# Patient Record
Sex: FEMALE | Race: ASIAN
[De-identification: names, ages, dates, MRNs, and addresses within clinical notes are randomized per-mention and may not be internally consistent; named-entity substitution may affect disease eponyms.]

---

## 2017-10-10 ENCOUNTER — HOSPITAL ENCOUNTER (EMERGENCY)
Dept: HOSPITAL 74 - FER | Age: 32
Discharge: HOME | End: 2017-10-10
Payer: COMMERCIAL

## 2017-10-10 VITALS — SYSTOLIC BLOOD PRESSURE: 124 MMHG | TEMPERATURE: 99.1 F | HEART RATE: 98 BPM | DIASTOLIC BLOOD PRESSURE: 74 MMHG

## 2017-10-10 VITALS — BODY MASS INDEX: 23.1 KG/M2

## 2017-10-10 DIAGNOSIS — K92.9: ICD-10-CM

## 2017-10-10 DIAGNOSIS — B08.5: Primary | ICD-10-CM

## 2017-10-10 DIAGNOSIS — R50.9: ICD-10-CM

## 2017-10-10 LAB
BASOPHILS # BLD: 0.5 % (ref 0–2)
DEPRECATED RDW RBC AUTO: 12.1 % (ref 11.6–15.6)
EOSINOPHIL # BLD: 0.7 % (ref 0–4.5)
MCH RBC QN AUTO: 29.3 PG (ref 25.7–33.7)
MCHC RBC AUTO-ENTMCNC: 34 G/DL (ref 32–36)
MCV RBC: 86.2 FL (ref 80–96)
NEUTROPHILS # BLD: 71.3 % (ref 42.8–82.8)
PLATELET # BLD AUTO: 285 K/MM3 (ref 134–434)
PMV BLD: 7.8 FL (ref 7.5–11.1)
WBC # BLD AUTO: 11.3 K/MM3 (ref 4–10.8)

## 2017-10-10 NOTE — PDOC
History of Present Illness





- General


History Source: Patient


Exam Limitations: No Limitations





- History of Present Illness


Initial Comments: 


10/10/17 20:11





A portion of this note was documented by scribe services under my direction. I 

have reviewed the details of the note, within reason, and agree with the 

documentation.  The case summary and management plan written by me. 





Assessment and plan: This is a 32-year-old female who comes in complaining of 3 

days of the fever chills body aches sore throat and the lesions in her 

posterior oropharynx and mouth.





 Patient had rapid strep done that was negative





Patient had a Monospot sent that wont be done until tomorrow.  





Patient's CBC otherwise showed a white count of 11.3 with no left shift.





Given the lesions in patient's oropharynx and mouth most likely this is 

secondary to a coxsackie-type virus





Patient was told to continue Tylenol and Motrin alternate them as often as 

every 3 hours as needed for pain and fevers and called the ER tomorrow 

afternoon for the result of her mono test.





<Qiana Harden - Last Filed: 10/10/17 20:54>





- General


History Source: Patient


Exam Limitations: No Limitations





- History of Present Illness


Initial Comments: 





10/10/17 20:54


Patient is a 32 year old female with a significant past medical history of 

Gastroparises who presents to the ED with complaints of sore throat that began 

3 days ago.  Patient reports sore throat began saturday afternoon suddenly 

while at home.  She reports experiencing fever secondary to sore throat.  

Patient states experiencing joint pains and what she states pins and needles 

in her bones secondary to sore throat.  She reports experiencing fever and 

mouth sores secondary to sore throat.  Patient states she has a Hx of strep 

throat. 





Denies chest pain, SOB. Denies chills, sweating. Denies contact with sick 

individuals, out of state travel. Denies any other symptoms. 


Allergies: None


Surgical history: None


Social history: No smoking. No alcohol. No illicit drugs. 


PMD: None








<Renaldo Gomez - Last Filed: 10/10/17 20:55>





- General


Chief Complaint: Pain


Stated Complaint: FLU TYPE SYMPTOMS,SORE THROAT


Time Seen by Provider: 10/10/17 19:45





Past History





- Past Medical History


Anemia: No


Asthma: No


Cancer: No


Cardiac Disorders: No


GI Disorders: Yes (GASTROPARISES)





- Surgical History


Abdominal Surgery: No


Appendectomy: No


Cardiac Surgery: No


Cholecystectomy: No





- Suicide/Smoking/Psychosocial Hx


Smoking Status: No


Smoking History: Never smoked


Have you smoked in the past 12 months: No


Number of Cigarettes Smoked Daily: 0


Hx Alcohol Use: No


Drug/Substance Use Hx: No


Hx Substance Use Treatment: No





<Qiana Harden - Last Filed: 10/10/17 20:54>





<Renaldo Gomez - Last Filed: 10/10/17 20:55>





- Past Medical History


Allergies/Adverse Reactions: 


 Allergies











Allergy/AdvReac Type Severity Reaction Status Date / Time


 


No Known Allergies Allergy   Verified 10/10/17 19:39











Home Medications: 


Ambulatory Orders





No Home Medications 0 dose .ROUTE UTDICT 05/01/13 











**Review of Systems





- Review of Systems


Able to Perform ROS?: Yes


Comments:: 





10/10/17 20:55


General:  +Fever


No chills, no weakness, no weight loss 


HEENT: +Sore throat. +Mouth sores. 


No change in vision.  No ear pain


CardioVascular:  No chest pain or shortness of breath


Respiratory:No cough, or wheezing. 


Gastrointestinal:  no nausea, vomiting, diarrhea or constipation,  No rectal 

bleeding


Genitourinary:  No dysuria, hematuria, or frequency


Musculoskeletal:  No joint or muscle pain or swelling


Neurologic: No headache, vertigo, dizziness or loss of consciousness


Psychiatric: nor depression 


Skin: No rashes or easy bruising


Endocrine: no increased thirst or abnormal weight change


Allergic: no skin or latex allergy


All other systems reviewed and normal





All Other Systems: Reviewed and Negative





<Renaldo Gomez - Last Filed: 10/10/17 20:55>





*Physical Exam





- Vital Signs


 Last Vital Signs











Temp Pulse Resp BP Pulse Ox


 


 99.1 F   98 H  16   124/74   100 


 


 10/10/17 19:42  10/10/17 19:42  10/10/17 19:42  10/10/17 19:42  10/10/17 19:42














- Physical Exam


Comments: 





10/10/17 20:55


GENERAL: The patient is awake, alert, and fully 


oriented, in no acute distress.


HEAD: Normal with no signs of trauma.


POSTERIOR OROPHARYNX: MOderate amount of erythema with vesicular lesions. +

Vesicular lesions along base of tongue bilaterally. No exudates. 


NECK: submandibular lymphadenopathy bilaterally. 


EYES: Pupils equal, round and reactive to light, extraocular movements intact, 

sclera anicteric, conjunctiva clear.


EXTREMITIES: Normal range of motion, no edema.


NEUROLOGICAL: Normal speech, normal gait.


PSYCH: Normal mood, normal affect.


SKIN: Warm, Dry, normal turgor, no rashes or lesions noted.








<Renaldo Gomez - Last Filed: 10/10/17 20:55>





ED Treatment Course





- LABORATORY


CBC & Chemistry Diagram: 


 10/10/17 20:05








<Qiana Harden - Last Filed: 10/10/17 20:54>





- LABORATORY


CBC & Chemistry Diagram: 


 10/10/17 20:05








- ADDITIONAL ORDERS


Additional order review: 














 10/10/17 20:05 Group A Strep Rapid Antigen - Final





 Throat    NEGATIVE FOR THE ANTIGEN OF BETA HEMOLYTIC STREP GROUP A








 











  10/10/17





  20:05


 


RBC  4.49


 


MCV  86.2


 


MCHC  34.0


 


RDW  12.1


 


MPV  7.8


 


Neutrophils %  71.3


 


Lymphocytes %  18.7


 


Monocytes %  8.8


 


Eosinophils %  0.7


 


Basophils %  0.5














- Medications


Given in the ED: 


ED Medications














Discontinued Medications














Generic Name Dose Route Start Last Admin





  Trade Name Freq  PRN Reason Stop Dose Admin


 


Ibuprofen  600 mg 10/10/17 20:03 10/10/17 20:07





  Motrin -  PO 10/10/17 20:04  600 mg





  ONCE ONE   Administration














<Renlado Gomez - Last Filed: 10/10/17 20:55>





*DC/Admit/Observation/Transfer





<Qiana Harden - Last Filed: 10/10/17 20:54>





- Attestations


Scribe Attestion: 





10/10/17 20:55





Documentation prepared by Renaldo Gomez, acting as medical scribe for 

Qiana Harden MD/DO.





<Renaldo Gomez - Last Filed: 10/10/17 20:55>


Diagnosis at time of Disposition: 


 Fever and chills





Pharyngitis


Qualifiers:


 Pharyngitis/tonsillitis etiology: Coxsackie virus Qualified Code(s): B08.5 - 

Enteroviral vesicular pharyngitis





- Discharge Dispostion


Disposition: HOME


Condition at time of disposition: Stable





- Patient Instructions


Additional Instructions: 


The test we did for strep throat was negative however it is a rapid test and 

there is some false negative so it will be cultured and call in 2 days for the 

result of the culture.





The Monospot will not be available until tomorrow afternoon U can call tomorrow 

afternoon for the result.





Alternate acetaminophen with ibuprofen as often as every 3 hours for pain or 

fevers.





You were given some prednisone in the emergency room this should help with the 

inflammatory response of your throat.





Return to the emergency department immediately with ANY new, persistent or 

worsening symptoms.





Continue any medications as previously prescribed by your physician.





You should follow up with your primary doctor as soon as possible regarding 

today's emergency department visit.


.


Please make sure your doctor reviews the results of your emergency evaluation.





Thank you for coming to the   Emergency Department today for your care. It was 

a pleasure to see you today. Please note that your evaluation is INCOMPLETE 

until you  follow-up with your doctor.

## 2018-12-30 ENCOUNTER — HOSPITAL ENCOUNTER (EMERGENCY)
Dept: HOSPITAL 74 - FER | Age: 33
Discharge: HOME | End: 2018-12-30
Payer: COMMERCIAL

## 2018-12-30 VITALS — BODY MASS INDEX: 22.4 KG/M2

## 2018-12-30 VITALS — TEMPERATURE: 98.8 F | SYSTOLIC BLOOD PRESSURE: 140 MMHG | HEART RATE: 89 BPM | DIASTOLIC BLOOD PRESSURE: 87 MMHG

## 2018-12-30 DIAGNOSIS — R07.89: Primary | ICD-10-CM

## 2018-12-30 LAB
ANION GAP SERPL CALC-SCNC: 9 MMOL/L (ref 8–16)
BASOPHILS # BLD: 0.4 % (ref 0–2)
BUN SERPL-MCNC: 9 MG/DL (ref 7–18)
CALCIUM SERPL-MCNC: 9.7 MG/DL (ref 8.4–10.2)
CHLORIDE SERPL-SCNC: 104 MMOL/L (ref 98–107)
CO2 SERPL-SCNC: 24 MMOL/L (ref 22–28)
CREAT SERPL-MCNC: 0.8 MG/DL (ref 0.6–1.3)
DEPRECATED RDW RBC AUTO: 11.9 % (ref 11.6–15.6)
EOSINOPHIL # BLD: 1.4 % (ref 0–4.5)
GLUCOSE SERPL-MCNC: 99 MG/DL (ref 74–106)
HCT VFR BLD CALC: 44.5 % (ref 32.4–45.2)
HGB BLD-MCNC: 15.1 GM/DL (ref 10.7–15.3)
LYMPHOCYTES # BLD: 26 % (ref 8–40)
MCH RBC QN AUTO: 29.6 PG (ref 25.7–33.7)
MCHC RBC AUTO-ENTMCNC: 33.8 G/DL (ref 32–36)
MCV RBC: 87.5 FL (ref 80–96)
MONOCYTES # BLD AUTO: 3.9 % (ref 3.8–10.2)
NEUTROPHILS # BLD: 68.3 % (ref 42.8–82.8)
PLATELET # BLD AUTO: 322 K/MM3 (ref 134–434)
PMV BLD: 8.1 FL (ref 7.5–11.1)
POTASSIUM SERPLBLD-SCNC: 4.3 MMOL/L (ref 3.5–5.1)
RBC # BLD AUTO: 5.09 M/MM3 (ref 3.6–5.2)
SODIUM SERPL-SCNC: 137 MMOL/L (ref 136–145)
WBC # BLD AUTO: 9.9 K/MM3 (ref 4–10.8)

## 2018-12-30 NOTE — PDOC
History of Present Illness





- General


Chief Complaint: Palpitations


Stated Complaint: CHEST TIGHTNESS, PALPITATIONS. SOB


Time Seen by Provider: 12/30/18 10:06


History Source: Patient, Old Records


Exam Limitations: No Limitations





- History of Present Illness


Initial Comments: 





HPI: 





32 y/o female presenting to [facility] complaining of chest tightness and 

palpitations. Pt states the chest tightness is episodic and progressive to the 

point where her bra becomes uncomfortable. Discomfort is localized to the 

sternum per pt. Endorses dry cough. Palpitations are brief and self resolving; 

lasts less than one minute. Estimates approx. 4 episodes per day. Unable to 

identify eliciting factors. Denies orthopnea, paroxysmal nocturnal dyspnea, or 

leg swelling. Denies recent surgeries, periods of immobilization, or calf 

tenderness. Pt takes birth control. 





Pts boyfriend, Dr. Pollack, expressed concern to the pt that she may have a PE.





PCP: Dr. Avendano





Medical Hx: 


- H/o Mitral Valve Regurgitation. Pt reports she had an Echo performed years 

ago but was never evaluated by a cardiologist.





Surgical Hx: 


- Pt denies past surgical history.











Past History





- Past Medical History


Allergies/Adverse Reactions: 


 Allergies











Allergy/AdvReac Type Severity Reaction Status Date / Time


 


No Known Allergies Allergy   Verified 12/30/18 10:08











Home Medications: 


Ambulatory Orders





No Home Medications 0 dose .ROUTE UTDICT 05/01/13 


Birth Control  12/30/18 








Anemia: No


Asthma: No


Cancer: No


Cardiac Disorders: No


GI Disorders: Yes (GASTROPARISES)





- Surgical History


Abdominal Surgery: No


Appendectomy: No


Cardiac Surgery: No


Cholecystectomy: No





- Suicide/Smoking/Psychosocial Hx


Smoking Status: No


Smoking History: Never smoked


Have you smoked in the past 12 months: No


Number of Cigarettes Smoked Daily: 0


Hx Alcohol Use: No


Drug/Substance Use Hx: No


Substance Use Type: None


Hx Substance Use Treatment: No





**Review of Systems





- Review of Systems


Able to Perform ROS?: Yes


Comments:: 





In addition to that documented in the HPI above, the additional ROS was obtained

:


Constitutional: Denies fevers or chills


Eyes: Denies vision changes


ENMT: Denies sore throat


CV: Endorses palpitations


Resp: Endorses cough


GI: Denies vomiting or diarrhea


: Denies painful urination


MSK: Endorses occasional leg cramping


Skin: Denies new rashes


Neuro: Denies new numbness or tingling or weakness


Endocrine: Denies polyuria


Heme: Denies bleeding or bruising











*Physical Exam





- Physical Exam


Comments: 





Constitutional: Well-developed, well-nourished female in no acute distress or 

obvious discomfort. Found semi-fowlers on hospital bed. Initially observed 

walking unassisted into the department without difficulty or distress. Alert 

and oriented x4. Answered all questions appropriately and completely. Speech 

was non-labored, non-pressured.    





Head: Normocephalic. No obvious external signs of trauma. 





Eyes: Pupils 4mm and PERRL bilaterally. Sclerae white. Conjunctiva moist and 

not injected. 





EARS: Hearing grossly intact.





NOSE: No nasal discharge.





THROAT: Oral cavity and pharynx normal. No inflammation, swelling, exudate, or 

lesions. Teeth and gingiva in good general condition.


 


Neck: Supple, trachea is midline. 





Cardiovascular/ Chest: Regular rate and regular rhythm. Clear heart tones. No 

murmur, rubs, clicks, or gallops. Peripheral pulses: radial pulses full. 

Anterior chest wall tenderness to palpation along R and L sternal border.   


 


Respiratory: Breathing unlabored. Equal chest rise and fall. Clear to 

auscultation bilaterally. No stridor, no wheezing, no rhonchi. 


 


Gastrointestinal: abdomen is soft, non-tender, non-distended. 


 


Neuro: Alert and oriented. Moving all four extremities spontaneously. Gait 

normal.


  


Skin: Warm, dry, and intact. No bruising, rashes, or other lesions. 


 


Psych: Affect: appropriate.  Mood: normal.











ED Treatment Course





- LABORATORY


CBC & Chemistry Diagram: 


 12/30/18 10:55





 12/30/18 10:55





Medical Decision Making





- Medical Decision Making





*Reviewed vital signs, nursing notes, and prior visit documentation (if 

available).





32 y/o previously healthy female presenting with reproducible anterior chest 

wall tenderness made worse with direct palpation and wearing a bra. Afebrile. 

Mildly tachycardic on triage vitals that resolved without intervention less 

than 10 minutes later. No tachypnea. Suspect costochondritis. Low suspicion for 

ACS, arrhythmia, PE, or pericardial effusion. Will administer ibuprofen. 





EKG: Sinus rhythm with a ventricular rate of 89 bpm. Normal axis. Normal 

intervals. No ST segment elevation or depression. No hyperacute T waves. No 

pathologic Q waves.  





Wells' Criteria for Pulmonary Embolism 


RESULT SUMMARY:


1.5 points


Low risk group: 1.3% chance of PE in an ED population. 


Another study assigned scores ? 4 as PE Unlikely and had a 3% incidence of PE.


INPUTS:


Clinical signs and symptoms of DVT > 0 = No


PE is #1 diagnosis OR equally likely > 0 = No


Heart rate > 100 > 1.5 = Yes


Immobilization at least 3 days OR surgery in the previous 4 weeks > 0 = No


Previous, objectively diagnosed PE or DVT > 0 = No


Hemoptysis > 0 = No


Malignancy w/ treatment within 6 months or palliative > 0 = No





Pt expressed further concern for PE to ED attending. Requested CTA to be 

further evaluated. Will obtain CBC, BMP, troponin, CTA, and urine pregnancy. D-

Dimer was not ordered as the test is a send out from this facility requiring >4 

hours to result and pt requested radiologic study.   





CBC unremarkable for leukocytosis or anemia.  





BMP unremarkable for electrolyte derangement. eGFR >60.   





UPreg negative.





CTA unremarkable for pulmonary embolism or acute pathology. Continue suspect 

costochondritis. Recommended OTC NSAIDs for symptom relief. 





Discussed imaging and laboratory results with pt. Answered all questions. 

Provided return precautions. Pt expressed verbal understanding and agreement 

with plan to discharge home with outpatient follow up.








*DC/Admit/Observation/Transfer


Diagnosis at time of Disposition: 


 Chest pain, atypical








- Discharge Dispostion


Disposition: HOME


Condition at time of disposition: Good


Decision to Admit order: No





- Referrals


Referrals: 


Eb Avendano MD [Primary Care Provider] - 





- Patient Instructions


Printed Discharge Instructions:  DI for Costochondritis


Additional Instructions: 


You were seen today for chest pain. Your blood work, EKG, and xrays were normal 

today. Your symptoms are likely a condition for costochondritis. The pain 

should go away in the next several days. 





You can take over the counter Motrin or Advil as needed for pain. Take as 

directed on the package insert. Do not exceed the recommended dosage. 





You should follow up with your primary care doctor within the next week or as 

needed to make sure you are healing. You will need to call to make an 

appointment. 





Go to the nearest emergency department if your condition worsens or you feel 

like you need additional emergency evaluation.





Print Language: ENGLISH





- Post Discharge Activity

## 2018-12-30 NOTE — PDOC
Attending Attestation





- Resident


Resident Name: Aj Power





- ED Attending Attestation


I have performed the following: I have examined & evaluated the patient, The 

case was reviewed & discussed with the resident, I agree w/resident's findings 

& plan





- HPI


HPI: 





12/30/18 10:56


32 y/o female with chest tightness and palpitations 4 days ago, denies fall or 

trauma or heavy lifting, now with some chest discomfort. Denies back pain, 

fever or chills. Mild cough. Has not taken anything for the pain. On BCP, non 

smoker, no long distance traveling, gets SOB occasionally doing minor things.  

As per her boyfriend who is a doctor he advised to have a CTA to r/o PE. No leg 

pain or swelling. Recent death of her uncle 2 weeks ago. Sits for long periods. 

No hx of cancer.


12/30/18 11:17








- Physicial Exam


PE: 





12/30/18 11:02


VS stable


HEENT: unremarkable


HEART: RRR without murmur


Lungs: CTA b/l no wheezes, rhonchi or rales, reproducible chest pain on 

palpation 


ABD: soft nontender +BS


EXT: no C/C/E, no calf tenderness b/l


Neuro: grossly intact, no focal deficits noted





- Medical Decision Making





12/30/18 13:38


CT chest Neg for PE





Will discharge home with Costochondritis


Pt is in agreement with plan


Motrin, rest





EKG: rate 89, No STEMI





Final Dx: Constochondritis





In agreement with plan, discussed with Resident Dr. Power

## 2018-12-30 NOTE — EKG
Test Reason : 

Blood Pressure : ***/*** mmHG

Vent. Rate : 089 BPM     Atrial Rate : 089 BPM

   P-R Int : 146 ms          QRS Dur : 080 ms

    QT Int : 364 ms       P-R-T Axes : 065 064 027 degrees

   QTc Int : 442 ms

 

NORMAL SINUS RHYTHM

NORMAL ECG

NO PREVIOUS ECGS AVAILABLE

Confirmed by ANGELO STALEY, DUANE ([id number]) on 12/30/2018 11:27:47 AM

 

Referred By: OTIS SPIVEY           Confirmed By:DUANE STEPHENS MD

## 2020-10-18 ENCOUNTER — HOSPITAL ENCOUNTER (EMERGENCY)
Dept: HOSPITAL 74 - JVIRT | Age: 35
Discharge: HOME | End: 2020-10-18
Payer: COMMERCIAL

## 2020-10-18 DIAGNOSIS — Z03.818: Primary | ICD-10-CM

## 2020-10-18 PROCEDURE — C9803 HOPD COVID-19 SPEC COLLECT: HCPCS

## 2020-10-18 PROCEDURE — U0003 INFECTIOUS AGENT DETECTION BY NUCLEIC ACID (DNA OR RNA); SEVERE ACUTE RESPIRATORY SYNDROME CORONAVIRUS 2 (SARS-COV-2) (CORONAVIRUS DISEASE [COVID-19]), AMPLIFIED PROBE TECHNIQUE, MAKING USE OF HIGH THROUGHPUT TECHNOLOGIES AS DESCRIBED BY CMS-2020-01-R: HCPCS

## 2020-10-18 NOTE — TELE
HPI





- General


Reason For Visit: COVID TESTING


History Source: Patient


Exam Limitations: No Limitations





- History of Present Illness


Associated Symptoms: reports: denies symptoms





10/18/20 10:00


Patient with no significant past medical history present to [facility] urgent care 

for Covid testing for travel to [place] in 6 days.  Patient denies any symptoms 

and denies any sick contact.  Denies cough, shortness of breath.  Patient repor

ts she is required to have a negative Covid test within 5 days of travel to be 

allowed entry into the [place].  Denies any other symptoms





Past History





- Medical History


Allergies/Adverse Reactions: 


                                    Allergies











Allergy/AdvReac Type Severity Reaction Status Date / Time


 


No Known Allergies Allergy   Verified 12/30/18 10:08











Home Medications: 


Ambulatory Orders





Ketorolac Tromethamine [Toradol] 10 mg PO Q6H #28 tablet 06/15/20 








Anemia: No


Asthma: No


Cancer: No


Cardiac Disorders: No


COPD: No


GI Disorders: Yes (GASTROPARESIS)





- Surgical History


Abdominal Surgery: No


Appendectomy: Yes (6/13/2020)


Cardiac Surgery: No


Cholecystectomy: No





- Psycho-Social/Smoking History


Smoking Status: No


Smoking History: Never smoked


Have you smoked in the past 12 months: No


Number of Cigarettes Smoked Daily: 0





**Review of Systems





- Review of Systems


Able to Perform ROS?: Yes


Limited English proficient: No


Constitutional: No: Chills, Fever, Malaise


HEENTM: No: Symptoms Reported, See HPI, Eye Pain, Blurred Vision, Tearing, 

Recent change in vision, Double Vision, Cataracts, Ear Pain, Ocular Prothesis, 

Ear Discharge, Nose Pain, Nose Congestion, Tinnitus, Nose Bleeding, Hearing 

Loss, Throat Pain, Throat Swelling, Mouth Pain, Dental Problems, Difficulty 

Swallowing, Mouth Swelling, Other


Respiratory: No: Symptoms reported, See HPI, Cough, Orthopnea, Shortness of 

Breath, SOB with Exertion, SOB at Rest, Stridor, Wheezing, Productive cough, 

Hemoptysis, Other


Cardiac (ROS): No: Symptoms Reported, See HPI, Chest Pain, Edema, Irregular 

Heart Rate, Lightheadedness, Palpitations, Syncope, Chest Tightness, Other


ABD/GI: No: Symptoms Reported, Nausea, Vomiting


Musculoskeletal: No: Symptoms Reported


Integumentary: No: Symptoms Reported


Neurological: No: Symptoms reported


All Other Systems: Reviewed and Negative





*Physical Exam





- Physical Exam


General Appearance: Yes: Nourished, Appropriately Dressed.  No: Apparent 

Distress


HEENT: positive: Normal ENT Inspection


Respiratory/Chest: negative: Normal Breath Sounds, Respiratory Distress


Musculoskeletal: positive: Normal Inspection


Extremity: positive: Normal Inspection, Normal Range of Motion


Integumentary: positive: Normal Color


Neurologic: positive: Fully Oriented, Alert, Normal Mood/Affect, Normal 

Response, Motor Strength 5/5





- Medical Decision Making





10/18/20 10:01


Patient with no significant past medical history present to [facility] urgent care 

for Covid testing for travel to [place] in 6 days.  Patient denies any symptoms 

and denies any sick contact.  Denies cough, shortness of breath.  Patient 

reports she is required to have a negative Covid test within 5 days of travel to

 be allowed entry into the [place].  Denies any other symptoms





Patient asymptomatic at this time.  Given today is 6 days out for patient's date

 of travel, will put in order for Covid test for tomorrow for patient to go to 

[facility] drive-through testing center tomorrow for Covid testing.  Patient 

stable for discharge





Discharge


Diagnosis at time of Disposition: 


 Counseled about COVID-19 virus infection








- Referrals


Follow-up Referral(s): 


Eb Avendano MD [Primary Care Provider] - 





- Patient Instructions





- Discharge


Disposition: HOME


Condition at time of Disposition: Stable

## 2020-10-18 NOTE — XMS
Summarization Of Episode

                           Created on:2020



Patient:CHARLENE BERMAN

Sex:Female

:1985

External Reference #:[id number]





Demographics







                          Address                   [address]



                                                    [place] [address]

 

                          Home Phone                [phone]

 

                          Work Phone                [phone]

 

                          Email Address             [email or web address]

 

                          Preferred Language        shakir

 

                          Marital Status            Not  or 

 

                          Scientology Affiliation     IS

 

                          Race                      AS

 

                          Ethnic Group              Not  or 









Author







                          Organization              [facility]









Support







                Name            Relationship    Address         Phone

 

                [facility]     [address]

 [phone]



                                                [place] [address] 

 

                [facility]          Unavailable     [address] [phone]



                                                [place] [address] 

 

                LEATHA BERMAN  FATHER          [address] [phone]



                                                [place] [address] 

 

                LEATHA BERMAN  Parent          [address]av[address]

able



                                                [place] [address] 









Re-disclosure Warning

The records that you are about to access may contain information from federally-
assisted alcohol or drug abuse programs. If such information is present, then 
the following federally mandated warning applies: This information has been 
disclosed to you from records protected by federal confidentiality rules (42 CFR
part 2). The federal rules prohibit you from making any further disclosure of 
this information unless further disclosure is expressly permitted by the written
consent of the person to whom it pertains or as otherwise permitted by 42 CFR 
part 2. A general authorization for the release of medical or other information 
is NOT sufficient for this purpose. The Federal rules restrict any use of the 
information to criminally investigate or prosecute any alcohol or drug abuse 
patient.The records that you are about to access may contain highly sensitive 
health information, the redisclosure of which is protected by Article 27-F of 
the [facility] Public Health law. If you continue you may haveaccess to 
information: Regarding HIV / AIDS; Provided by facilities licensed or operated 
by the [facility] Office of Mental Health; or Provided by the [facility]
Office for People With Developmental Disabilities. If such information is 
present, then the following [place] mandated warning applies: This 
information has been disclosed to you from confidential records which are 
protected by state law. State law prohibits you from making any further 
disclosure of this information without the specific written consent of the 
person to whom it pertains, or as otherwise permitted by law. Any unauthorized 
further disclosure in violation of state law may result in a fine or group home 
sentence or both. A general authorization for the release of medical or other 
information is NOT sufficient authorization for further disclosure.



Insurance Providers







          Payer name Policy type Policy ID Covered   Covered party's Policy    P

brad



                    / Coverage           party ID  relationship to [place]    Inf

ormation



                    type                          west              

 

          [id number] -           [id number]                             [id number]

[id number]



          [facility]            [id number]                             [id number]

[phone]







Results







                    ID                  Date                Data Source

 

                    [id number]         2020 01:30:00 PM EDT [facility]











          Name      Value     Range     Interpretation Description Data      Sup

porting



                                        Code                Source(s) Document(s

)

 

          SARS                                              [facility]   



          CORONAVIRUS 2                                                   



          RNA                                                         









                                        This lab was ordered by [facility] and reported by [facility].











                                        Procedure

## 2023-08-19 ENCOUNTER — HOSPITAL ENCOUNTER (EMERGENCY)
Dept: HOSPITAL 74 - JER | Age: 38
LOS: 1 days | Discharge: HOME | End: 2023-08-20
Payer: COMMERCIAL

## 2023-08-19 VITALS
TEMPERATURE: 98.2 F | SYSTOLIC BLOOD PRESSURE: 134 MMHG | HEART RATE: 80 BPM | RESPIRATION RATE: 18 BRPM | DIASTOLIC BLOOD PRESSURE: 86 MMHG

## 2023-08-19 VITALS — BODY MASS INDEX: 26.2 KG/M2

## 2023-08-19 DIAGNOSIS — L03.213: Primary | ICD-10-CM

## 2023-08-19 DIAGNOSIS — H57.11: ICD-10-CM

## 2023-08-19 DIAGNOSIS — H57.89: ICD-10-CM
